# Patient Record
Sex: MALE | Race: WHITE | Employment: FULL TIME | URBAN - METROPOLITAN AREA
[De-identification: names, ages, dates, MRNs, and addresses within clinical notes are randomized per-mention and may not be internally consistent; named-entity substitution may affect disease eponyms.]

---

## 2024-01-22 ENCOUNTER — OFFICE VISIT (OUTPATIENT)
Dept: SLEEP MEDICINE | Facility: CLINIC | Age: 54
End: 2024-01-22
Payer: COMMERCIAL

## 2024-01-22 VITALS
SYSTOLIC BLOOD PRESSURE: 136 MMHG | DIASTOLIC BLOOD PRESSURE: 82 MMHG | RESPIRATION RATE: 16 BRPM | HEART RATE: 67 BPM | OXYGEN SATURATION: 96 % | HEIGHT: 72 IN | WEIGHT: 284 LBS | BODY MASS INDEX: 38.47 KG/M2

## 2024-01-22 DIAGNOSIS — G47.33 OSA (OBSTRUCTIVE SLEEP APNEA): Primary | ICD-10-CM

## 2024-01-22 PROCEDURE — 99205 OFFICE O/P NEW HI 60 MIN: CPT | Performed by: NURSE PRACTITIONER

## 2024-01-22 ASSESSMENT — SLEEP AND FATIGUE QUESTIONNAIRES
HOW LIKELY ARE YOU TO NOD OFF OR FALL ASLEEP WHILE SITTING QUIETLY AFTER LUNCH WITHOUT ALCOHOL: SLIGHT CHANCE OF DOZING
HOW LIKELY ARE YOU TO NOD OFF OR FALL ASLEEP WHEN YOU ARE A PASSENGER IN A CAR FOR AN HOUR WITHOUT A BREAK: MODERATE CHANCE OF DOZING
HOW LIKELY ARE YOU TO NOD OFF OR FALL ASLEEP IN A CAR, WHILE STOPPED FOR A FEW MINUTES IN TRAFFIC: WOULD NEVER DOZE
HOW LIKELY ARE YOU TO NOD OFF OR FALL ASLEEP WHILE SITTING AND TALKING TO SOMEONE: SLIGHT CHANCE OF DOZING
HOW LIKELY ARE YOU TO NOD OFF OR FALL ASLEEP WHILE SITTING AND READING: MODERATE CHANCE OF DOZING
HOW LIKELY ARE YOU TO NOD OFF OR FALL ASLEEP WHILE SITTING INACTIVE IN A PUBLIC PLACE: SLIGHT CHANCE OF DOZING
HOW LIKELY ARE YOU TO NOD OFF OR FALL ASLEEP WHILE LYING DOWN TO REST IN THE AFTERNOON WHEN CIRCUMSTANCES PERMIT: HIGH CHANCE OF DOZING
HOW LIKELY ARE YOU TO NOD OFF OR FALL ASLEEP WHILE WATCHING TV: MODERATE CHANCE OF DOZING

## 2024-01-22 NOTE — NURSING NOTE
Chief Complaint   Patient presents with    Consult For     sleep       Initial /82   Pulse 67   Resp 16   Ht 1.829 m (6')   Wt 128.8 kg (284 lb)   SpO2 96%   BMI 38.52 kg/m   Estimated body mass index is 38.52 kg/m  as calculated from the following:    Height as of this encounter: 1.829 m (6').    Weight as of this encounter: 128.8 kg (284 lb).    Medication Reconciliation: complete    Neck circumference:  inches / 48 centimeters.    DME:     Martha Del Angel MA  [facility]

## 2024-01-22 NOTE — PROGRESS NOTES
Outpatient Sleep Medicine Consultation:      Name: Issa Freeman MRN# [id number]   Age: 53 year old YOB: 1970     Date of Consultation: January 22, 2024  Consultation is requested by: No referring provider defined for this encounter. No ref. provider found  Primary care provider: Wachter, Amy P       Reason for Sleep Consult:     Issa Freeman is sent by No ref. provider found for a sleep consultation regarding previously diagnosed with RUBEN.    Patient s Reason for visit  Issa Freeman main reason for visit: sleep apnea snoring tired all the time  Patient states problem(s) started: many years ago  Issa Freeman's goals for this visit: better sleep better life           Assessment and Plan:     Summary Sleep Diagnoses and Recommendations:  Previously dx'd with severe RUBEN, currently untreated   Comprehensive order for CPAP with pressure set at 6-20 cm H2O, needed supplies and equipment ordered         Comorbid Diagnoses:  Class II Obesity  Depression  Elevated blood pressure    Summary Counseling:    Initial Sleep Testing Reviewed  Obstructive Sleep Apnea Reviewed  Complications of Untreated Sleep Apnea Reviewed          Total time spent reviewing medical records, history and physical examination, review of previous testing and interpretation as well as documentation on this date: 60 minutes    CC: No ref. provider found          History of Present Illness:     Issa Freeman presents to the sleep medicine clinic with concerns of untreated severe obstructive sleep apnea, in need of CPAP therapy.  He is attending today's appointment in the company of his wife.    Patient notes moderate difficulties staying asleep and dissatisfaction with his sleep quality.    RISA score= 18/28    3-4 awakenings per night for snort arousals, pain, external stimulation, nightmares, need for elimination    Patient reports he wants improved quality sleep, not necessarily more sleep    Prefers to sleep laterally, prone head of bed  elevated    Naps nearly every day, for 1-2 hours.  Unintentionally naps every day  ESS score= 12/24    Socially disruptive snoring, snort arousals, witnessed episodes of apnea    Morning headaches, multiple nocturnal awake, frequent nocturnal leg movements, recurring nightmares, bruxism, night terrors, hyperhidrosis, pain at night, dry mouth upon awakening, shortness of breath with activity, awakening with shortness of breath gasp arousals, and can keep her legs, nocturnal GERD, depressed mood and anxiety.    Known sleep apnea with STOP-BANG score is 6/8    No meds    Social History:  Bed partner  Snoring   at [employer]  Works 6-2, commutes 3 minutes  I child 17 y/ stepdaughter at home        Past Sleep Evaluations:    Patient underwent a split-night polysomnogram on the evening of 6/20/2023 for snoring, apneas, excessive daytime sleepiness.  His ESS score was 12/24.  BMI was 39.5, neck size 19 inches.  Study to place at [facility] in [facility].    AHI, combined: 106.5 events/hour.  Patient slept in supine, and his AHI reflect supine sleep.  SaO2 fady: 75%.  Significant hypoxemia was noted with total number of minutes of SaO2 below or equal to 88% was 42.2 minutes.  Split-night protocol was not initiated.  No parasomnias, no bruxism.      SLEEP-WAKE SCHEDULE:     Work/School Days: Patient goes to school/work: Yes   Usually gets into bed at 10pm  Takes patient about few minutes to fall asleep  Has trouble falling asleep twice a week nights per week  Wakes up in the middle of the night 3-4 times times.  Wakes up due to Snorting self awake;Pain;External stimuli (bed partner, pets, noise, etc);Use the bathroom;Anxiety;Nightmares  He has trouble falling back asleep   times a week.   It usually takes   to get back to sleep  Patient is usually up at 5am  Uses alarm: Yes    Weekends/Non-work Days/All Other Days:  Usually gets into bed at 11pm   Takes patient about few minutes to fall  asleep  Patient is usually up at 9-10 am  Uses alarm: No    Sleep Need  Patient gets  5-7 hours sleep on average   Patient thinks he needs about i need better quality of sleep not more sleep sleep    Issa Freeman prefers to sleep in this position(s): Side;Stomach;Head Elevated   Patient states they do the following activities in bed: Watch TV    Naps  Patient takes a purposeful nap almost everyday times a week and naps are usually an hour sometimes two hours in duration  He feels better after a nap: No  He dozes off unintentionally almost everyday days per week dozes after supper 6pm. Asleep by 630  Patient has had a driving accident or near-miss due to sleepiness/drowsiness: No Only if driving lengthy timea  or T NIGHT      SLEEP DISRUPTIONS:    Breathing/Snoring  Patient snores:Yes  Other people complain about his snoring: Yes  Patient has been told he stops breathing in his sleep:Yes  He has issues with the following: Morning headaches;Getting up to urinate more than once. 2-+ yes morning headaches.  Movement:  Patient gets pain, discomfort, with an urge to move:  Yes restless legs symptoms  It happens when he is resting:  Yes  It happens more at night:  Yes  Patient has been told he kicks his legs at night:  No     Behaviors in Sleep:  Issa Freeman has experienced the following behaviors while sleeping: Recurring Nightmares;Teeth grinding;Night terrors (screaming,yelling or acting afraid but not recalling event)  He has experienced sudden muscle weakness during the day: Yes  Pt denies bruxism, sleep talking, sleep walking, and dream enactment behavior. Pt denies sleep paralysis, hypnagogue and cataplexy.       Is there anything else you would like your sleep provider to know: not that i can think of      CAFFEINE AND OTHER SUBSTANCES:    Patient consumes caffeinated beverages per day:  1-2 soda per day  Last caffeine use is usually: 2pm  List of any prescribed or over the counter stimulants that patient takes:  none  List of any prescribed or over the counter sleep medication patient takes: none  List of previous sleep medications that patient has tried: none  Patient drinks alcohol to help them sleep: No  Patient drinks alcohol near bedtime: No    Family History:  Patient has a family member been diagnosed with a sleep disorder: No            SCALES:    EPWORTH SLEEPINESS SCALE         1/22/2024     8:43 AM    [place] Sleepiness Scale ( SUAD Rowe  9839-3837<br>ESS - [place]/English - Final version - 21 Nov 07 - [facility] [place].)   Sitting and reading Moderate chance of dozing   Watching TV Moderate chance of dozing   Sitting, inactive in a public place (e.g. a theatre or a meeting) Slight chance of dozing   As a passenger in a car for an hour without a break Moderate chance of dozing   Lying down to rest in the afternoon when circumstances permit High chance of dozing   Sitting and talking to someone Slight chance of dozing   Sitting quietly after a lunch without alcohol Slight chance of dozing   In a car, while stopped for a few minutes in traffic Would never doze   [place] Score (MC) 12   [place] Score (Sleep) 12         INSOMNIA SEVERITY INDEX (RISA)          1/22/2024     8:27 AM   Insomnia Severity Index (RISA)   Difficulty falling asleep 1   Difficulty staying asleep 2   Problems waking up too early 1   How SATISFIED/DISSATISFIED are you with your CURRENT sleep pattern? 3   How NOTICEABLE to others do you think your sleep problem is in terms of impairing the quality of your life? 3   How WORRIED/DISTRESSED are you about your current sleep problem? 4   To what extent do you consider your sleep problem to INTERFERE with your daily functioning (e.g. daytime fatigue, mood, ability to function at work/daily chores, concentration, memory, mood, etc.) CURRENTLY? 4   RISA Total Score 18       Guidelines for Scoring/Interpretation:  Total score categories:  0-7 = No clinically significant insomnia   8-14 = Subthreshold  "insomnia   15-21 = Clinical insomnia (moderate severity)  22-28 = Clinical insomnia (severe)  Used via courtesy of www.myhealth.va.gov with permission from Ignacio Cifuentes PhD., [facility]      STOP BANG         1/22/2024     8:44 AM   STOP BANG Questionnaire (  2008, the American Society of Anesthesiologists, Inc. [employer]   1. Snoring - Do you snore loudly (louder than talking or loud enough to be heard through closed doors)? Yes   2. Tired - Do you often feel tired, fatigued, or sleepy during daytime? Yes   3. Observed - Has anyone observed you stop breathing during your sleep? Yes   4. Blood pressure - Do you have or are you being treated for high blood pressure? No   5. BMI - BMI more than 35 kg/m2? Yes   6. Age - Age over 50 yr old? Yes   7. Neck circumference - Neck circumference greater than 40 cm? Yes   8. Gender - Gender male? Yes   STOP BANG Score (MC): 6 (High risk of RUBEN)         GAD7         No data to display                  CAGE-AID         No data to display                CAGE-AID reprinted with permission from the [place] Medical Journal, ABIMBOLA Luna and RANDAL Lambert, \"Conjoint screening questionnaires for alcohol and drug abuse\" [place] Medical Journal 94: 135-140, 1995.      PATIENT HEALTH QUESTIONNAIRE-9 (PHQ - 9)         No data to display                Developed by Vincent Conley, Martha Mancia, Stefan Gallagher and colleagues, with an educational roderick from [employer] No permission required to reproduce, translate, display or distribute.        Allergies:    Not on File    Medications:    No current outpatient medications on file.       Problem List:  There are no problems to display for this patient.       Past Medical/Surgical History:  No past medical history on file.  No past surgical history on file.    Social History:  Social History     Socioeconomic History    Marital status:      Spouse name: Not on file    Number of children: " Not on file    Years of education: Not on file    Highest education level: Not on file   Occupational History    Not on file   Tobacco Use    Smoking status: Not on file    Smokeless tobacco: Not on file   Substance and Sexual Activity    Alcohol use: Not on file    Drug use: Not on file    Sexual activity: Not on file   Other Topics Concern    Not on file   Social History Narrative    Not on file     Social Determinants of Health     Financial Resource Strain: Not on file   Food Insecurity: Not on file   Transportation Needs: Not on file   Physical Activity: Not on file   Stress: Not on file   Social Connections: Not on file   Interpersonal Safety: Not on file   Housing Stability: Not on file       Family History:  No family history on file.    Review of Systems:  A complete review of systems reviewed by me is negative with the exeption of what has been mentioned in the history of present illness.  In the last TWO WEEKS have you experienced any of the following symptoms?  Fevers: No  Night Sweats: Yes  Weight Gain: No  Pain at Night: Yes  Double Vision: No  Changes in Vision: No  Difficulty Breathing through Nose: Yes  Sore Throat in Morning: No  Dry Mouth in the Morning: Yes  Shortness of Breath Lying Flat: No  Shortness of Breath With Activity: Yes  Awakening with Shortness of Breath: Yes  Increased Cough: No  Heart Racing at Night: Yes  Swelling in Feet or Legs: No  Diarrhea at Night: No  Heartburn at Night: Yes  Urinating More than Once at Night: Yes  Losing Control of Urine at Night: No  Joint Pains at Night: Yes  Headaches in Morning: Yes  Weakness in Arms or Legs: No  Depressed Mood: Yes  Anxiety: Yes     Physical Examination:  Vitals: /82   Pulse 67   Resp 16   Ht 1.829 m (6')   Wt 128.8 kg (284 lb)   SpO2 96%   BMI 38.52 kg/m    BMI= Body mass index is 38.52 kg/m .       Physical Exam  Vitals and nursing note reviewed. Exam conducted with a chaperone present.   Constitutional:       General: He  "is awake. He is not in acute distress.     Appearance: Normal appearance. He is well-developed and well-groomed. He is obese. He is not ill-appearing, toxic-appearing or diaphoretic.   HENT:      Head: Normocephalic and atraumatic.      Right Ear: External ear normal.      Left Ear: External ear normal.      Nose: Nose normal.      Mouth/Throat:      Mouth: Mucous membranes are moist.      Pharynx: Oropharynx is clear.   Eyes:      Conjunctiva/sclera: Conjunctivae normal.   Cardiovascular:      Rate and Rhythm: Normal rate. Rhythm irregular.      Pulses: Normal pulses.      Heart sounds: Normal heart sounds.   Pulmonary:      Effort: Pulmonary effort is normal.      Breath sounds: Normal breath sounds.   Musculoskeletal:         General: Normal range of motion.      Cervical back: Normal range of motion and neck supple.   Skin:     General: Skin is warm and dry.      Capillary Refill: Capillary refill takes less than 2 seconds.   Neurological:      General: No focal deficit present.      Mental Status: He is alert and oriented to person, place, and time.   Psychiatric:         Mood and Affect: Mood normal.         Behavior: Behavior normal. Behavior is cooperative.         Thought Content: Thought content normal.         Judgment: Judgment normal.                      Data: All pertinent previous laboratory data reviewed     No results for input(s): \"NA\", \"POTASSIUM\", \"CHLORIDE\", \"CO2\", \"ANIONGAP\", \"GLC\", \"BUN\", \"CR\", \"BERLIN\" in the last 73362 hours.    No results for input(s): \"WBC\", \"RBC\", \"HGB\", \"HCT\", \"MCV\", \"MCH\", \"MCHC\", \"RDW\", \"PLT\" in the last 44443 hours.    No results for input(s): \"PROTTOTAL\", \"ALBUMIN\", \"BILITOTAL\", \"ALKPHOS\", \"AST\", \"ALT\", \"BILIDIRECT\" in the last 03325 hours.    No results found for: \"TSH\"    No results found for: \"UAMP\", \"UBARB\", \"BENZODIAZEUR\", \"UCANN\", \"UCOC\", \"OPIT\", \"UPCP\"    No results found for: \"IRONSAT\", \"BR69735\", \"YANIRA\"    No results found for: \"PH\", \"PHARTERIAL\", \"PO2\", " "\"HZ0HEFHYPSM\", \"SAT\", \"PCO2\", \"HCO3\", \"BASEEXCESS\", \"DIANA\", \"BEB\"    @LABRCNTIPR(phv:4,pco2v:4,po2v:4,hco3v:4,mario:4,o2per:4)@    Echocardiology: No results found for this or any previous visit (from the past 4320 hour(s)).    Chest x-ray: No results found for this or any previous visit from the past 365 days.      Chest CT: No results found for this or any previous visit from the past 365 days.      PFT: Most Recent B[facility]ze Pulmonary Function Testing        Lili Herrera, GAGAN CNP 1/22/2024   Sleep Medicine    This note was written with the assistance of the Dragon voice-dictation technology software. The final document, although reviewed, may contain errors. For corrections, please contact the office.    "

## 2024-01-22 NOTE — PATIENT INSTRUCTIONS
Drive Safe... Drive Alive     Sleep health profoundly affects your health, mood, and your safety. 33% of the population (one in three of us) is not getting enough sleep and many have a sleep disorder. Not getting enough sleep or having an untreated / undertreated sleep condition may make us sleepy without even knowing it. In fact, our driving could be dramatically impaired due to our sleep health. As your provider, here are some things I would like you to know about driving:     Here are some warning signs for impairment and dangerous drowsy driving:              -Having been awake more than 16 hours               -Looking tired               -Eyelid drooping              -Head nodding (it could be too late at this point)              -Driving for more than 30 minutes     Some things you could do to make the driving safer if you are experiencing some drowsiness:              -Stop driving and rest              -Call for transportation              -Make sure your sleep disorder is adequately treated     Some things that have been shown NOT to work when experiencing drowsiness while driving:              -Turning on the radio              -Opening windows              -Eating any  distracting  /  entertaining  foods (e.g., sunflower seeds, candy, or any other)              -Talking on the phone      Your decision may not only impact your life, but also the life of others. Please, remember to drive safe for yourself and all of us.     Drive Safe... Drive Alive     Sleep health profoundly affects your health, mood, and your safety. 33% of the population (one in three of us) is not getting enough sleep and many have a sleep disorder. Not getting enough sleep or having an untreated / undertreated sleep condition may make us sleepy without even knowing it. In fact, our driving could be dramatically impaired due to our sleep health. As your provider, here are some things I would like you to know about driving:     Here are  some warning signs for impairment and dangerous drowsy driving:              -Having been awake more than 16 hours               -Looking tired               -Eyelid drooping              -Head nodding (it could be too late at this point)              -Driving for more than 30 minutes     Some things you could do to make the driving safer if you are experiencing some drowsiness:              -Stop driving and rest              -Call for transportation              -Make sure your sleep disorder is adequately treated     Some things that have been shown NOT to work when experiencing drowsiness while driving:              -Turning on the radio              -Opening windows              -Eating any  distracting  /  entertaining  foods (e.g., sunflower seeds, candy, or any other)              -Talking on the phone      Your decision may not only impact your life, but also the life of others. Please, remember to drive safe for yourself and all of us.   Your Body mass index is 38.52 kg/m .  Weight management is a personal decision.  If you are interested in exploring weight loss strategies, the following discussion covers the approaches that may be successful. Body mass index (BMI) is one way to tell whether you are at a healthy weight, overweight, or obese. It measures your weight in relation to your height.  A BMI of 18.5 to 24.9 is in the healthy range. A person with a BMI of 25 to 29.9 is considered overweight, and someone with a BMI of 30 or greater is considered obese. More than two-thirds of American adults are considered overweight or obese.  Being overweight or obese increases the risk for further weight gain. Excess weight may lead to heart disease and diabetes.  Creating and following plans for healthy eating and physical activity may help you improve your health.  Weight control is part of healthy lifestyle and includes exercise, emotional health, and healthy eating habits. Careful eating habits lifelong are  the mainstay of weight control. Though there are significant health benefits from weight loss, long-term weight loss with diet alone may be very difficult to achieve- studies show long-term success with dietary management in less than 10% of people. Attaining a healthy weight may be especially difficult to achieve in those with severe obesity. In some cases, medications, devices and surgical management might be considered.  What can you do?  If you are overweight or obese and are interested in methods for weight loss, you should discuss this with your provider.   Consider reducing daily calorie intake by 500 calories.   Keep a food journal.   Avoiding skipping meals, consider cutting portions instead.    Diet combined with exercise helps maintain muscle while optimizing fat loss. Strength training is particularly important for building and maintaining muscle mass. Exercise helps reduce stress, increase energy, and improves fitness. Increasing exercise without diet control, however, may not burn enough calories to loose weight.     Start walking three days a week 10-20 minutes at a time  Work towards walking thirty minutes five days a week   Eventually, increase the speed of your walking for 1-2 minutes at time    In addition, we recommend that you review healthy lifestyles and methods for weight loss available through the National Institutes of Health patient information sites:  http://win.niddk.nih.gov/publications/index.htm    And look into health and wellness programs that may be available through your health insurance provider, employer, local community center, or daisy club.

## 2024-01-29 ENCOUNTER — DOCUMENTATION ONLY (OUTPATIENT)
Dept: SLEEP MEDICINE | Facility: CLINIC | Age: 54
End: 2024-01-29
Payer: COMMERCIAL

## 2024-01-29 DIAGNOSIS — G47.33 OSA (OBSTRUCTIVE SLEEP APNEA): Primary | ICD-10-CM

## 2024-01-29 NOTE — PROGRESS NOTES
Patient was offered choice of vendor and chose [facility].  Patient Issa Freeman was set up at [place]  on January 29, 2024. Patient received a Resmed Airsense 10 Pressures were set at  6-20 cm H2O.   Patient s ramp is 5 cm H2O for Auto and FLEX/EPR is EPR, 2.  Patient received a Resmed Mask name: Airfit P30i  Pillow mask size Large, heated tubing and heated humidifier.  Patient has the following compliance requirements: none  Patient has a follow up on n/a with Lili Herrera CNP.    Darlin Sheets

## 2024-02-01 ENCOUNTER — DOCUMENTATION ONLY (OUTPATIENT)
Dept: SLEEP MEDICINE | Facility: CLINIC | Age: 54
End: 2024-02-01
Payer: COMMERCIAL

## 2024-02-01 NOTE — PROGRESS NOTES
3 day Sleep therapy management telephone visit    Diagnostic AHI: 106.5 PSG    Confirmed with patient at time of call- N/A Patient is still interested in STM service       Message left for patient to return call    Order settings:  CPAP MIN CPAP MAX   6 cm H2O 20 cm H2O       Device settings:  CPAP MIN CPAP MAX EPR RESMED SOFT RESPONSE SETTING   6.0 cm  H20 20.0 cm  H20 TWO OFF       Compliance 100 %    Assessment: Nightly usage over four hours     Patient has the following upcoming sleep appts:      Replacement device: No  STM ordered by provider: Yes     Total time spent on accessing and  interpreting remote patient PAP therapy data  10 minutes    Total time spent counseling, coaching  and reviewing PAP therapy data with patient  1 minutes    [id number] no

## 2024-02-14 ENCOUNTER — DOCUMENTATION ONLY (OUTPATIENT)
Dept: SLEEP MEDICINE | Facility: CLINIC | Age: 54
End: 2024-02-14
Payer: COMMERCIAL

## 2024-02-14 DIAGNOSIS — G47.33 OSA (OBSTRUCTIVE SLEEP APNEA): Primary | ICD-10-CM

## 2024-02-14 NOTE — PROGRESS NOTES
14  DAY STM VISIT    Diagnostic AHI: 106.5  PSG    Message left for patient to return call     Assessment: Pt not meeting objective benchmarks for leak     Action plan: waiting for patient to return call.  and pt to have 30 day STM visit.      Device type: Auto-CPAP    PAP settings:  CPAP MIN CPAP MAX 95TH % PRESSURE EPR RESMED SOFT RESPONSE SETTING   6.0 cm  H20 20.0 cm  H20 12.3 cm  H20  TWO OFF     Mask type:  Nasal Mask    Objective measures: 14 day rolling measures   COMPLIANCE LEAK AHI AVERAGE USE IN MINUTES   78 % 27.68 1.99 302   GOAL >70% GOAL < 24 LPM GOAL <5 GOAL >240      Patient has the following upcoming sleep appts:      Total time spent on accessing and interpreting remote patient PAP therapy data  10 minutes    Total time spent counseling, coaching  and reviewing PAP therapy data with patient  1 minute    [id number]  [id number]  no (3 day STM)

## 2024-02-16 NOTE — PROGRESS NOTES
Pt reutrned call. He shared that he feels like his CPAP is not working like it used to when he first got it. He describes in the beginning that it was clear that there was a lot of air and he had some trouble exhaling against the pressure. Now he reports he does not have that difficulty exhaling and that someitmes he feels suffocated and like he needs to take the mask off to take deeper breaths. Explained to him that the machine is still working as expected, and adequately treating his sleep apnea, and that it is likely that he got used to how CPAP feels vs when he first started and that is creating the feeling of less air pressure. Will pend order for pressure increase to help with air hunger 8 - 20 cm h2O.

## 2024-02-22 ENCOUNTER — OFFICE VISIT (OUTPATIENT)
Dept: SLEEP MEDICINE | Facility: CLINIC | Age: 54
End: 2024-02-22
Payer: COMMERCIAL

## 2024-02-22 VITALS
SYSTOLIC BLOOD PRESSURE: 136 MMHG | DIASTOLIC BLOOD PRESSURE: 81 MMHG | BODY MASS INDEX: 38.87 KG/M2 | OXYGEN SATURATION: 100 % | HEART RATE: 64 BPM | WEIGHT: 287 LBS | HEIGHT: 72 IN

## 2024-02-22 DIAGNOSIS — G47.33 OSA (OBSTRUCTIVE SLEEP APNEA): Primary | ICD-10-CM

## 2024-02-22 PROCEDURE — 99215 OFFICE O/P EST HI 40 MIN: CPT | Performed by: NURSE PRACTITIONER

## 2024-02-22 ASSESSMENT — SLEEP AND FATIGUE QUESTIONNAIRES
HOW LIKELY ARE YOU TO NOD OFF OR FALL ASLEEP WHILE SITTING AND READING: SLIGHT CHANCE OF DOZING
HOW LIKELY ARE YOU TO NOD OFF OR FALL ASLEEP IN A CAR, WHILE STOPPED FOR A FEW MINUTES IN TRAFFIC: WOULD NEVER DOZE
HOW LIKELY ARE YOU TO NOD OFF OR FALL ASLEEP WHILE LYING DOWN TO REST IN THE AFTERNOON WHEN CIRCUMSTANCES PERMIT: HIGH CHANCE OF DOZING
HOW LIKELY ARE YOU TO NOD OFF OR FALL ASLEEP WHILE SITTING INACTIVE IN A PUBLIC PLACE: SLIGHT CHANCE OF DOZING
HOW LIKELY ARE YOU TO NOD OFF OR FALL ASLEEP WHILE SITTING AND TALKING TO SOMEONE: WOULD NEVER DOZE
HOW LIKELY ARE YOU TO NOD OFF OR FALL ASLEEP WHEN YOU ARE A PASSENGER IN A CAR FOR AN HOUR WITHOUT A BREAK: SLIGHT CHANCE OF DOZING
HOW LIKELY ARE YOU TO NOD OFF OR FALL ASLEEP WHILE WATCHING TV: MODERATE CHANCE OF DOZING
HOW LIKELY ARE YOU TO NOD OFF OR FALL ASLEEP WHILE SITTING QUIETLY AFTER LUNCH WITHOUT ALCOHOL: SLIGHT CHANCE OF DOZING

## 2024-02-22 NOTE — PROGRESS NOTES
Sleep Apnea - Follow-up Visit:    Impression/Plan:  1. RUBEN (obstructive sleep apnea)  - Comprehensive DME     Severe Sleep apnea. Tolerating PAP fairly well for only 23 days of use. Daytime symptoms are improved, overall, but reports increased symptoms of air hunger and air leakage related to mouth opening during therapy.  We did discuss the use of chinstrap with his nasal pillow mask and also consideration for pressure setting adjustment due to symptoms of air hunger.    A review of his APAP download data shows good use and compliance and very severe RUBEN that is well-controlled on current pressure settings.  Of note, there is mildly excessive air leak seen on download data, likely associated with mouth opening during sleep.    A comprehensive DME order was placed for pressure setting adjustment to 10-20 cm H2O.  This will be adjusted in the clinic today.  He will contact his DME for a chinstrap or may obtain one through [email or web address] if he would like.  If these interventions are not of significant benefit consideration for PAP titration study should be discussed OR minimally a changing mask to FFM, possibly.    Issa Freeman will follow up in about 3 month(s).     Forty minutes spent with patient, all of which were spent face-to-face counseling, consulting, chart review/documentation, and coordinating plan of care on the date of the encounter.      GAGAN Pandey CNP  Sleep Medicine      CC:  Wachter, Amy P,         History of Present Illness:  Chief Complaint   Patient presents with    CPAP Follow Up       Issa Freeman is a 53-year-old male with a PMH pertinent for elevated BP without diagnosis of HTN, depression, RUBEN, and obesity who presents for follow-up of their severe sleep apnea, managed with CPAP.  He was last seen in an office visit on 1/22/2024 with Lili Herrera NP, in sleep consultation to establish care and to obtain/start PAP therapy after previously completed outside overnight polysomnography which  showed a very severe RUBEN and sleep associated hypoxemia. Patient has the following compliance requirements: none.  Overall, he reports things are improved, however, he does have symptoms of air hunger and mouth opening with PAP use.    Previous Study Results: [place], [place] - [facility]  Date: 6/20/2023.  Weight 291.6 pounds.  AHI: 106.5/hr. Supine AHI: 106.5/hr. No REM or S3 sleep. O2 fady 75.0%.  Tiime SpO2 </= 88%: 43.2 minutes  No PLMs. No parasomnias. No bruxism.    DME: [facility]      Do you use a CPAP Machine at home: Yes  Overall, on a scale of 0-10 how would you rate your CPAP (0 poor, 10 great): 0    What type of mask do you use: Nasal Pillow  Is your mask comfortable: No  If not, why: nose pillows, rub my nose sometimes    Is your mask leaking: No  If yes, where do you feel it:    How many night per week does the mask leak (0-7):      Do you notice snoring with mask on: No  Do you notice gasping arousals with mask on: Yes  Are you having significant oral or nasal dryness: Yes  Is the pressure setting comfortable: No  If not, why: too much sometimes too little    What is your typical bedtime: 10pm  How long does it take you to go to sleep on PAP therapy: 2 minutes  What time do you typically get out of bed for the day: 430 am  How many hours on average per night are you using PAP therapy: 6 hours  How many hours are you sleeping per night: 6-8 hours  Do you feel well rested in the morning: No      ResMed AirSense 10 (set up on 1/29/2024 at [facility])  Auto-PAP 8.0 - 20.0 cmH2O - 23 day usage data:   1/29/24 - 2/21/24  82% of days with > 4 hours of use. 0/30 days with no use.   Average use 314 minutes per day.   95%ile Leak 25.98 L/min.   CPAP 95% pressure 11.8 cm.   AHI 1.94 events per hour.        EPWORTH SLEEPINESS SCALE         2/22/2024     3:02 PM    [place] Sleepiness Scale ( SUAD Rowe  [id number]<br>ESS - [place]/English - Final version - 21 Nov 07 - [employer].)    Sitting and reading Slight chance of dozing   Watching TV Moderate chance of dozing   Sitting, inactive in a public place (e.g. a theatre or a meeting) Slight chance of dozing   As a passenger in a car for an hour without a break Slight chance of dozing   Lying down to rest in the afternoon when circumstances permit High chance of dozing   Sitting and talking to someone Would never doze   Sitting quietly after a lunch without alcohol Slight chance of dozing   In a car, while stopped for a few minutes in traffic Would never doze   Goodland Score (MC) 9   Goodland Score (Sleep) 9       INSOMNIA SEVERITY INDEX (RISA)          2/22/2024     2:57 PM   Insomnia Severity Index (RISA)   Difficulty falling asleep 1   Difficulty staying asleep 2   Problems waking up too early 3   How SATISFIED/DISSATISFIED are you with your CURRENT sleep pattern? 3   How NOTICEABLE to others do you think your sleep problem is in terms of impairing the quality of your life? 2   How WORRIED/DISTRESSED are you about your current sleep problem? 3   To what extent do you consider your sleep problem to INTERFERE with your daily functioning (e.g. daytime fatigue, mood, ability to function at work/daily chores, concentration, memory, mood, etc.) CURRENTLY? 3   RISA Total Score 17       Guidelines for Scoring/Interpretation:  Total score categories:  0-7 = No clinically significant insomnia   8-14 = Subthreshold insomnia   15-21 = Clinical insomnia (moderate severity)  22-28 = Clinical insomnia (severe)  Used via courtesy of www.LiveGOealth.va.gov with permission from Ignacio Cifuentes PhD., [facility]      Past medical/surgical history, family history, social history, medications and allergies were reviewed.      Problem List:  Patient Active Problem List    Diagnosis Date Noted    RUBEN (obstructive sleep apnea) 02/22/2024     Priority: Medium     Previous Study Results: [place], [place] - [facility]  Date: 6/20/2023.  Weight 291.6  "pounds.  AHI: 106.5/hr. Supine AHI: 106.5/hr. No REM or S3 sleep. O2 fady 75.0%.  Tiime SpO2 </= 88%: 43.2 minutes  No PLMs. No parasomnias. No bruxism.        No current outpatient medications on file.     No current facility-administered medications for this visit.     /81   Pulse 64   Ht 1.829 m (6' 0.01\")   Wt 130.2 kg (287 lb)   SpO2 100%   BMI 38.92 kg/m        This note was written with the assistance of the Dragon voice-dictation technology software. The final document, although reviewed, may contain errors. For corrections, please contact the office.    "

## 2024-03-06 ENCOUNTER — DOCUMENTATION ONLY (OUTPATIENT)
Dept: SLEEP MEDICINE | Facility: CLINIC | Age: 54
End: 2024-03-06
Payer: COMMERCIAL

## 2024-03-13 ENCOUNTER — TELEPHONE (OUTPATIENT)
Dept: SLEEP MEDICINE | Facility: CLINIC | Age: 54
End: 2024-03-13
Payer: COMMERCIAL

## 2024-03-13 NOTE — TELEPHONE ENCOUNTER
Spoke with patient regarding pressure concerns. Patient's pressures match the most current RX with 10-20 cm H2O. Patient's ramp is Off and EPR is turned Off. Patient is stating that he feels like he is not getting enough air. Looking at the detailed report for the past 14 days, patient is having some leak over the threshold, however, patient's AHI is minimized. We spoke about patient's pillows mask potentially being an issue to feeling like he is not getting enough air because of the way the pressures are distributed. Patient would be interested in trying a nasal cushion mask to see if that resolves his air hunger. I scheduled patient for a mask consult at [facility] on Tuesday 03/19/2024 at 2pm with Jerir. Patient will bring his machine and supplies to see if we can see if there could be other barriers. I discussed with patient that I could loop in his provider on what the patient is experiencing. I let him know I would send his provider a message to see if they could follow up with the patient about him feeling like he is not getting enough air.     [facility]  Customer Service: [phone]

## 2024-03-19 ENCOUNTER — DOCUMENTATION ONLY (OUTPATIENT)
Dept: SLEEP MEDICINE | Facility: CLINIC | Age: 54
End: 2024-03-19
Payer: COMMERCIAL

## 2024-03-19 NOTE — PROGRESS NOTES
Patient and partner came into [place] for the CPAP appt/Mask Consult.  He is struggling with the CPAP, he is not able to breath correctly with it.  Found his EPR was off, his pressures are correct and has the ramp off too.  He felt with starting at 5cmH20 it was not enough and the 50pbR24 is good.  Pulled his Airview report which shows leakage and his partner confirmed he is opening his mouth.  Expl the different options on all the full fce masks, expl the pro/cons for each one.  He chose to start with the AirFit F20 - medium.  Had him put on his new mask, test everything out.  Changed his machine back to an average beginning settings but no ramp time.  He said it feels like he needs to push more when he is exhaling.  Changed his EPR to 3cmH20.  He was comfortable with that.  He did say he was a bit nervous/anxiety was high to use this.  Asked that he just relax a bit while his partner and I chatted about things.  He felt comfortable but still feels like he was not getting enough air.  I changed his start pressure to 24rhS87 to see how he felt breathing, he said that was too much and I changed it back to 94aoC23 start.  Explained to him if he feels like he is not getting enough air to send a msg to the doctor to discuss this.  The dr will have no problem with making changes if he thinks it will benefit him.  As he left - he felt much better about things and is looking forward to using it tonight with the Full Face mask.  They were both thankful for my time.    Jerri Huertas

## 2025-03-14 NOTE — PATIENT INSTRUCTIONS
Problem: ABCDS Injury Assessment  Goal: Absence of physical injury  3/14/2025 1501 by Gustabo Gómez, RN  Outcome: Adequate for Discharge  3/14/2025 0526 by Hipolito Benitez LPN  Outcome: Progressing  Flowsheets (Taken 3/13/2025 2259)  Absence of Physical Injury: Implement safety measures based on patient assessment     Problem: Safety - Adult  Goal: Free from fall injury  3/14/2025 1501 by Gustabo Gómez, RN  Outcome: Adequate for Discharge  3/14/2025 0526 by Hipolito Benitez LPN  Outcome: Progressing  Flowsheets (Taken 3/13/2025 2259)  Free From Fall Injury: Instruct family/caregiver on patient safety      "MY INFORMATION ON SLEEP APNEA-  Issa Freeman    DOCTOR : GAGAN Pandey CNP  SLEEP CENTER :      MY CONTACT NUMBER:   [facility]  [phone]  [facility]   [phone]  [facility]   [phone]      [facility]  [phone]  [facility]   [phone]    DME:  [facility] [address], [address]  [place] [address]  Phone: [phone]    Hours:  Mon - Fri: 8:00 a.m. - 4:30 p.m.  Sat: Closed  Sun: Closed      Key Points:  1. What is Obstructive Sleep Apnea (RUBEN)? RUBEN is the most common type of sleep apnea. Apnea literally means, \"without breath.\" It is characterized by repetitive pauses in breathing, despite continued effort to breathe, and is usually associated with a reduction in blood oxygen saturation. Apneas can last 10 to over 60 seconds. It is caused by narrowing or collapse of the upper airway as muscles relax during sleep.   2. What are the consequences of RUBEN? Symptoms include: daytime sleepiness- possibly increasing the risk of falling asleep while driving, unrefreshing/restless sleep, snoring, insomnia, waking frequently to urinate, waking with heartburn or reflux, reduced concentration and memory, and morning headaches. Other health consequences may include development of high blood pressure. Untreated RUBEN also can contribute to heart disease, stroke and diabetes.   3. What are the treatment options? In most situations, sleep apnea is a lifelong disease that must be managed with daily therapy. Continuous Positive Airway (CPAP) is the most reliable treatment. A mouthguard to hold your jaw forward is usually the next most reliable option. Other options include postioning devices (to keep you off your back), nasal valves, tongue retaining device, weight loss, surgery. There is more detail about these options toward the end of this " document.  4. What are the most important things to remember about using CPAP?     WHERE CAN I FIND MORE INFORMATION?    American Academy of Sleep Medicine Patient information on sleep disorders:  http://yoursleep.aasmnet.org    CPAP -  WHY AND HOW?                 Continuous positive airway pressure, or CPAP, is the most effective treatment for obstructive sleep apnea. It works by blowing room air, through a mask, to hold your throat open. A decision to use CPAP is a major step forward in the pursuit of a healthier life. The successful use of CPAP will help you breathe easier, sleep better and live healthier. Using CPAP can be a positive experience if you keep these chavez points in mind:  Commitment  CPAP is not a quick fix for your problem. It involves a long-term commitment to improve your sleep and your health.    Communication  Stay in close communication with both your sleep doctor and your CPAP supplier. Ask lots of questions and seek help when you need it.    Consistency  Use CPAP all night, every night and for every nap. You will receive the maximum health benefits from CPAP when you use it every time that you sleep. This will also make it easier for your body to adjust to the treatment.    Correction  The first machine and mask that you try may not be the best ones for you. Work with your sleep doctor and your CPAP supplier to make corrections to your equipment selection. Ask about trying a different type of machine or mask if you have ongoing problems. Make sure that your mask is a good fit and learn to use your equipment properly.    Challenge  Tell a family member or close friend to ask you each morning if you used your CPAP the previous night. Have someone to challenge you to give it your best effort.    Connection   Your adjustment to CPAP will be easier if you are able to connect with others who use the same treatment. Ask your sleep doctor if there is a support group in your area for people who have  "sleep apnea, or look for one on the Internet.  Comfort   Increase your level of comfort by using a saline spray, decongestant or heated humidifier if CPAP irritates your nose, mouth or throat. Use your unit's \"ramp\" setting to slowly get used to the air pressure level. There may be soft pads you can buy that will fit over your mask straps. Look on www.CPAP.com for accessories that can help make CPAP use more comfortable.  Cleaning   Clean your mask, tubing and headgear on a regular basis. Put this time in your schedule so that you don't forget to do it. Check and replace the filters for your CPAP unit and humidifier.    Completion   Although you are never finished with CPAP therapy, you should reward yourself by celebrating the completion of your first month of treatment. Expect this first month to be your hardest period of adjustment. It will involve some trial and error as you find the machine, mask and pressure settings that are right for you.    Continuation  After your first month of treatment, continue to make a daily commitment to use your CPAP all night, every night and for every nap.    CPAP-Tips to starting with success:  Begin using your CPAP for short periods of time during the day while you watch TV or read.    Use CPAP every night and for every nap. Using it less often reduces the health benefits and makes it harder for your body to get used to it.    Newer CPAP models are virtually silent; however, if you find the sound of your CPAP machine to be bothersome, place the unit under your bed to dampen the sound.     Make small adjustments to your mask, tubing, straps and headgear until you get the right fit. Tightening the mask may actually worsen the leak.  If it leaves significant marks on your face or irritates the bridge of your nose, it may not be the best mask for you.  Speak with the person who supplied the mask and consider trying other masks. Insurances will allow you to try different masks " during the first month of starting CPAP.  Insurance also covers a new mask, hose and filter about every 6 months.    Use a saline nasal spray to ease mild nasal congestion. Neti-Pot or saline nasal rinses may also help. Nasal gel sprays can help reduce nasal dryness.  Biotene mouthwash can be helpful to protect your teeth if you experience frequent dry mouth.  Dry mouth may be a sign of air escaping out of your mouth or out of the mask in the case of a full face mask.  Speak with your provider if you expect that is the case.     Take a nasal decongestant to relieve more severe nasal or sinus congestion.  Do not use Afrin (oxymetazoline) nasal spray more than 3 days in a row.  Speak with your sleep doctor if your nasal congestion is chronic.    Use a heated humidifier that fits your CPAP model to enhance your breathing comfort. Adjust the heat setting up if you get a dry nose or throat, down if you get condensation in the hose or mask.  Position the CPAP lower than you so that any condensation in the hose drains back into the machine rather than towards the mask.    Try a system that uses nasal pillows if traditional masks give you problems.    Clean your mask, tubing and headgear once a week. Make sure the equipment dries fully.    Regularly check and replace the filters for your CPAP unit and humidifier.    Work closely with your sleep provider and your CPAP supplier to make sure that you have the machine, mask and air pressure setting that works best for you. It is better to stop using it and call your provider to solve problems than to lay awake all night frustrated with the device.  Weight Loss:    Weight loss decreases severity of sleep apnea in most people with obesity. For those with mild obesity who have developed snoring with weight gain, even 15-30 pound weight loss can improve and occasionally eliminate sleep apnea.  Structured and life-long dietary and health habits are necessary to lose weight and keep  healthier weight levels.     Though there are significant health benefits from weight loss, long-term weight loss is very difficult to achieve- studies show success with dietary management in less than 10% of people. In addition, substantial weight loss may require years of dietary control and may be difficult if patients have severe obesity. In these cases, surgical management may be considered.    If you are interested in methods for weight loss, you should review the options discussed at the National Institutes of Health patient information sites:     http://win.niddk.nih.gov/publications/index.htm  http:/www.health.nih.gov/topic/WeightLossDieting    Bariatric programs offer counseling in all methods of weight loss:    Http:/www.[email or web address]/WeightLossSurgeryandMedicalMgmt/htm    Your BMI is Body mass index is 38.92 kg/m .    Weight management plan: Patient was referred to their PCP to discuss a diet and exercise plan.    Body mass index (BMI) is one way to tell whether you are at a healthy weight, overweight, or obese. It measures your weight in relation to your height.  A BMI of 18.5 to 24.9 is in the healthy range. A person with a BMI of 25 to 29.9 is considered overweight, and someone with a BMI of 30 or greater is considered obese.  Another way to find out if you are at risk for health problems caused by overweight and obesity is to measure your waist. If you are a woman and your waist is more than 35 inches, or if you are a man and your waist is more than 40 inches, your risk of disease may be higher.  More than two-thirds of American adults are considered overweight or obese. Being overweight or obese increases the risk for further weight gain.  Excess weight may lead to heart disease and diabetes. Creating and following plans for healthy eating and physical activity may help you improve your health.    Methods for maintaining or losing weight.    Weight control is part of healthy  lifestyle and includes exercise, emotional health, and healthy eating habits.  Careful eating habits lifelong is the mainstay of weight control.  Though there are significant health benefits from weight loss, long-term weight loss with diet alone may be very difficult to achieve- studies show long-term success with dietary management in less than 10% of people. Attaining a healthy weight may be especially difficult to achieve in those with severe obesity. In some cases, medications, devices and surgical management might be considered.    What can you do?    If you are overweight or obese and are interested in methods for weight loss, you should discuss this with your provider. In addition, we recommend that you review healthy life styles and methods for weight loss available through the National Institutes of Health patient information sites:     http://win.niddk.nih.gov/publications/index.htm